# Patient Record
(demographics unavailable — no encounter records)

---

## 2025-03-12 NOTE — PLAN
[FreeTextEntry1] : -F/u labs and STI screening performed today  -RTO for TVUS, evaluate ovaries and structural sources of pelvic pain, tenderness on exam  -Reviewed possibly isolated incident, anovulatory cycle given it seems that she typically has monthly periods  -Records release form completed today, will evaluate need for pap/ preventive exam once received  -Call or RTO PRN for any new problems, questions or concerns

## 2025-03-12 NOTE — HISTORY OF PRESENT ILLNESS
[N] : Patient does not use contraception [Y] : Positive pregnancy history [Menarche Age: ____] : age at menarche was [unfilled] [No] : Patient does not have concerns regarding sex [Currently Active] : currently active [Men] : men [LMPDate] : 3/3/2025 [PGxTotal] : 1 [facility] [[facility] : [facility] [FreeTextEntry1] : 3/3/2025

## 2025-03-12 NOTE — PHYSICAL EXAM
[Appropriately responsive] : appropriately responsive [Alert] : alert [No Acute Distress] : no acute distress [Soft] : soft [Non-distended] : non-distended [No Mass] : no mass [Oriented x3] : oriented x3 [FreeTextEntry7] : tender in right and left LQ, no rebound, no guarding; no CVAT

## 2025-03-27 NOTE — REASON FOR VISIT
[Follow-Up] : a follow-up evaluation of [Language Line ] : provided by Language Line   [FreeTextEntry2] : [employer] Results  [Interpreters_IDNumber] : [id number] [Interpreters_FullName] : Radha  [TWNoteComboBox1] : [place]

## 2025-03-27 NOTE — HISTORY OF PRESENT ILLNESS
[Menarche Age: ____] : age at menarche was [unfilled] [Men] : men [N] : Patient does not use contraception [Y] : Positive pregnancy history [No] : Patient does not have concerns regarding sex [Currently Active] : currently active [PapSmeardate] : n/a [HIVDate] : 03/12/2025 [TextBox_53] : neg  [SyphilisDate] : 03/12/2025 [TextBox_58] : neg  [GonorrheaDate] : 03/12/2025 [TextBox_63] : neg  [ChlamydiaDate] : 03/12/2025 [TextBox_68] : neg  [HPVDate] : n/a [LMPDate] : 03/03/25 [PGxTotal] : 1 [[facility] [[facility] : 1 [FreeTextEntry1] : 03/03/25

## 2025-03-27 NOTE — HISTORY OF PRESENT ILLNESS
[Menarche Age: ____] : age at menarche was [unfilled] [Men] : men [N] : Patient does not use contraception [Y] : Positive pregnancy history [No] : Patient does not have concerns regarding sex [Currently Active] : currently active [PapSmeardate] : n/a [HIVDate] : 03/12/2025 [TextBox_53] : neg  [SyphilisDate] : 03/12/2025 [TextBox_58] : neg  [GonorrheaDate] : 03/12/2025 [TextBox_63] : neg  [ChlamydiaDate] : 03/12/2025 [TextBox_68] : neg  [HPVDate] : n/a [LMPDate] : 03/03/25 [PGxTotal] : 1 [[facility] : [facility] [[facility]] : 1 [FreeTextEntry1] : 03/03/25

## 2025-03-27 NOTE — PLAN
[FreeTextEntry1] : -Briefly reviewed multiple management options for PCOS, to initiate diet and lifestyle modification, may try Inositol -To return in 3 months and evaluate regularity of her periods between now and that visit -ALEX remains a good option if continued irregularities -Metformin discussed, though currently A1C is low and change in activity would be more ideal  -Call or RTO PRN for any new problems, questions or concerns

## 2025-03-27 NOTE — REASON FOR VISIT
[Follow-Up] : a follow-up evaluation of [Language Line ] : provided by Language [employer]   [FreeTextEntry2] : Sono Results  [Interpreters_IDNumber] : [id number] [Interpreters_FullName] : Radha  [TWNoteComboBox1] : [place]